# Patient Record
(demographics unavailable — no encounter records)

---

## 2021-12-29 NOTE — TELEPHONE ENCOUNTER
Dr. Alexy Juarez, patient interested in free prenatal vitamins and iron per response from [id number] [facility]. Order for [facility] Baby Box pending for your convenience. Thank you,  Krystyna Crowell, PharmD  [address] [address]  Department, toll free: [phone], option [employer] [address]      =======================================================================    [place] REVIEW - Be Well With Baby    KATELYN Maloney is a 32 y.o. female currently identified as interested in receiving a [facility] \"Baby Box\" containing a 1 year supply of prenatal vitamins from [address]. Contents of Baby Box:   Welcome Letter   6 month supply of [employer]'s Blend Prenatal Multivitamin with Minerals (Take 1 softgel once daily) with 1 refill    Thank you  Dianelys Crowell, PharmD  [address] [address]  Department, toll free: [phone], [facility] [employer] [address]